# Patient Record
Sex: FEMALE | Employment: UNEMPLOYED | URBAN - METROPOLITAN AREA
[De-identification: names, ages, dates, MRNs, and addresses within clinical notes are randomized per-mention and may not be internally consistent; named-entity substitution may affect disease eponyms.]

---

## 2023-04-20 ENCOUNTER — OFFICE VISIT (OUTPATIENT)
Dept: CARDIOLOGY | Facility: CLINIC | Age: 43
End: 2023-04-20
Payer: COMMERCIAL

## 2023-04-20 VITALS
HEIGHT: 67 IN | BODY MASS INDEX: 19.74 KG/M2 | RESPIRATION RATE: 16 BRPM | HEART RATE: 68 BPM | DIASTOLIC BLOOD PRESSURE: 64 MMHG | WEIGHT: 125.8 LBS | SYSTOLIC BLOOD PRESSURE: 116 MMHG

## 2023-04-20 DIAGNOSIS — R00.2 PALPITATIONS: Primary | ICD-10-CM

## 2023-04-20 DIAGNOSIS — I49.3 PVC'S (PREMATURE VENTRICULAR CONTRACTIONS): ICD-10-CM

## 2023-04-20 PROCEDURE — 93000 ELECTROCARDIOGRAM COMPLETE: CPT | Performed by: INTERNAL MEDICINE

## 2023-04-20 PROCEDURE — 99203 OFFICE O/P NEW LOW 30 MIN: CPT | Performed by: INTERNAL MEDICINE

## 2023-04-20 RX ORDER — VITAMIN B COMPLEX
1 TABLET ORAL DAILY
COMMUNITY

## 2023-04-20 RX ORDER — MESALAMINE 1.2 G/1
4 TABLET, DELAYED RELEASE ORAL AT BEDTIME
COMMUNITY
Start: 2021-05-14

## 2023-04-20 RX ORDER — SPIRONOLACTONE 50 MG/1
4 TABLET, FILM COATED ORAL DAILY
COMMUNITY
Start: 2021-12-23

## 2023-04-20 RX ORDER — ESCITALOPRAM OXALATE 20 MG/1
20 TABLET ORAL DAILY
COMMUNITY
Start: 2022-09-26

## 2023-04-20 RX ORDER — TRIAMCINOLONE ACETONIDE 1 MG/G
OINTMENT TOPICAL
COMMUNITY
Start: 2023-01-11

## 2023-04-20 NOTE — LETTER
2023    [facility] [address]  [place] [address]    RE: Mary Da Silva       Dear Colleague,     I had the pleasure of seeing Mary Da Silva in the [facility].     [facility]  Cardiac Electrophysiology  [address]  [place] [address]   Office: [phone]  Fax: [phone]     Cardiac Electrophysiology Consultation    Patient: Mary Da Silva   : 1980     Referring Provider: No ref. provider found  Primary Care Provider: Zakiya Willis MD    CHIEF COMPLAINT/REASON FOR CONSULTATION  Palpitations    Assessment/Recommendations   Mary Da Silva is a 43 year old female with palpitations, ulcerative colitis, anxiety referred for consultation regarding palpitations.    Palpitations - previously has been reported to be related to PVCs.  Etiology and arrhythmia burden presently unknown.   We reviewed PVCs and management options including expectant management, indications for therapy (symptoms, reduced ventricular function), therapy options including medical therapy and PVC mapping and ablation.    We will characterize her arrhythmia burden and substrate further:  - 2 week Zio monitoring  - TTE  - we will contact her when results of the above are available and review options    Follow up: as above         History of Present Illness   Mary Da Silva is a 43 year old female with palpitations, ulcerative colitis, anxiety referred for consultation regarding palpitations.    Mrs. Da Silva has a long history of intermittent isolated palpitations for several years and had previously undergone evaluation at other facilities [place] of state - records from these are not available for review.  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  She underwent TTEs, stress test, ambulatory rhythm monitoring (she does not recall reported PVC frequency).  She had previously been noted to have possible pulmonary  "hypertension and underwent RHC which was normal.  Her palpitations occur mostly with rest and are less frequent with activity (she notes PVCs reported at rest at prior stress tests, quiescent with activity).        She is active with cardio and lifting - she notes no exertional limitations.  She denies chest pain or syncope.  She is a PA-C.       Physical Examination  Review of Systems   VITALS: /64 (BP Location: Left arm, Patient Position: Sitting, Cuff Size: Adult Regular)   Pulse 68   Resp 16   Ht 1.702 m (5' 7\")   Wt 57.1 kg (125 lb 12.8 oz)   BMI 19.70 kg/m    Wt Readings from Last 3 Encounters:   No data found for Wt     CONSTITUTIONAL: well nourished, comfortable, no distress  EYES:  Conjunctivae pink, sclerae clear.    E/N/T:  Oral mucosa pink  RESPIRATORY:  Respiratory effort is normal  CARDIOVASCULAR:  normal S1 and S2  GASTROINTESTINAL:  Abdomen without masses or tenderness  EXTREMITIES:  No clubbing or cyanosis.    MUSCULOSKELETAL:  Overall grossly normal muscle strength  SKIN:  Overall, skin warm and dry, no lesions.  NEURO/PSYCH:  Oriented x 3 with normal affect.   Constitutional:  No weight loss or loss of appetite    Eyes:  No difficulty with vision, no double vision, no dry eyes  ENT:  No sore throat, difficulty swallowing; changes in hearing or tinnitus  Cardiovascular: As detailed above  Respiratory:  No cough  Musculoskeletal  No joint pain, muscle aches  Neurologic:  No syncope, lightheadedness, fainting spells   Hematologic: No easy bruising, excessive bleeding tendency   Gastrointestinal:  No jaundice, abdominal pain or abdominal bloating  Genitourinary: No changes in urinary habits, no trouble urinating    Psychiatric: No anxiety or depression      Medical History  Surgical History   No past medical history on file. No past surgical history on file.      Family History Social History   No family history on file.            Medications  Allergies   No current outpatient medications " on file.   Not on File       Lab Results    Chemistry CBC Cardiac Enzymes/BNP/TSH/INR   No results for input(s): NA, POTASSIUM, CHLORIDE, CO2, GLC, BUN, CR, GFRESTIMATED, STEPHANIE in the last 48223 hours.    Invalid input(s): GRFESTBLACK  No results for input(s): CR in the last 71007 hours.       No results for input(s): WBC, HGB, HCT, MCV, PLT in the last 01705 hours.  No results for input(s): HGB in the last 79744 hours. No results for input(s): TROPONINI in the last 83749 hours.  No results for input(s): BNP, NTBNPI, NTBNP in the last 62851 hours.  No results for input(s): TSH in the last 79970 hours.  No results for input(s): INR in the last 57441 hours.      Data Review    ECGs (tracings independently reviewed)  4/20/2023 (run for extended duration - no ectopy noted) - SR 58bpm, nonspecific repolarization abnormality, QT/QTc 440/431ms         Cc: Moises Angulo MD  4/20/2023  10:43 AM        Thank you for allowing me to participate in the care of your patient.      Sincerely,     Sofiya Angulo MD     [facility]  cc: No referring provider defined for this encounter.

## 2023-04-20 NOTE — PATIENT INSTRUCTIONS
[facility]  Cardiac Electrophysiology  [address]  [place] [address]   Office: [phone]  Fax: [phone]       Thank you for seeing us in clinic today - it is a pleasure to be a part of your care team.  Below is a summary of our plan from today's visit.      You have previously been noted to have premature ventricular contractions (PVCs).  We reviewed PVCs and management options including observation, indications for therapy (symptoms, reduced ventricular function), therapy options including medical therapy and PVC mapping and ablation.    We will characterize your arrhythmia burden and substrate further:  - 2 week Zio monitoring  - echocardiogram    Please do not hesitate to be in touch with our office at [phone] with any questions that may arise.      Thank you for trusting us with your care,    Sofiya Angulo MD  Clinical Cardiac Electrophysiology  [facility]  [address]  [place] [address]   Office: [phone]  Fax: [phone]

## 2023-04-20 NOTE — PROGRESS NOTES
[facility]  Cardiac Electrophysiology  [address]  [place] [address]   Office: [phone]  Fax: [phone]     Cardiac Electrophysiology Consultation    Patient: Mary Da Silva   : 1980     Referring Provider: No ref. provider found  Primary Care Provider: Moises Willis MD    CHIEF COMPLAINT/REASON FOR CONSULTATION  Palpitations    Assessment/Recommendations   Mary Da Silva is a 43 year old female with palpitations, ulcerative colitis, anxiety referred for consultation regarding palpitations.    Palpitations - previously has been reported to be related to PVCs.  Etiology and arrhythmia burden presently unknown.   We reviewed PVCs and management options including expectant management, indications for therapy (symptoms, reduced ventricular function), therapy options including medical therapy and PVC mapping and ablation.    We will characterize her arrhythmia burden and substrate further:  - 2 week Zio monitoring  - TTE  - we will contact her when results of the above are available and review options    Follow up: as above         History of Present Illness   Mary Da Silva is a 43 year old female with palpitations, ulcerative colitis, anxiety referred for consultation regarding palpitations.    Mrs. Da [place] has a long history of intermittent isolated palpitations for several years and had previously undergone evaluation at other facilities out of state - records from these are not available for review.  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  She underwent TTEs, stress test, ambulatory rhythm monitoring (she does not recall reported PVC frequency).  She had previously been noted to have possible pulmonary hypertension and underwent RHC which was normal.  Her palpitations occur mostly with rest and are less frequent with activity (she notes PVCs reported at rest at prior stress tests, quiescent with activity).        She  "is active with cardio and lifting - she notes no exertional limitations.  She denies chest pain or syncope.  She is a PA-C.       Physical Examination  Review of Systems   VITALS: /64 (BP Location: Left arm, Patient Position: Sitting, Cuff Size: Adult Regular)   Pulse 68   Resp 16   Ht 1.702 m (5' 7\")   Wt 57.1 kg (125 lb 12.8 oz)   BMI 19.70 kg/m    Wt Readings from Last 3 Encounters:   No data found for Wt     CONSTITUTIONAL: well nourished, comfortable, no distress  EYES:  Conjunctivae pink, sclerae clear.    E/N/T:  Oral mucosa pink  RESPIRATORY:  Respiratory effort is normal  CARDIOVASCULAR:  normal S1 and S2  GASTROINTESTINAL:  Abdomen without masses or tenderness  EXTREMITIES:  No clubbing or cyanosis.    MUSCULOSKELETAL:  Overall grossly normal muscle strength  SKIN:  Overall, skin warm and dry, no lesions.  NEURO/PSYCH:  Oriented x 3 with normal affect.   Constitutional:  No weight loss or loss of appetite    Eyes:  No difficulty with vision, no double vision, no dry eyes  ENT:  No sore throat, difficulty swallowing; changes in hearing or tinnitus  Cardiovascular: As detailed above  Respiratory:  No cough  Musculoskeletal  No joint pain, muscle aches  Neurologic:  No syncope, lightheadedness, fainting spells   Hematologic: No easy bruising, excessive bleeding tendency   Gastrointestinal:  No jaundice, abdominal pain or abdominal bloating  Genitourinary: No changes in urinary habits, no trouble urinating    Psychiatric: No anxiety or depression      Medical History  Surgical History   No past medical history on file. No past surgical history on file.      Family History Social History   No family history on file.            Medications  Allergies   No current outpatient medications on file.   Not on File       Lab Results    Chemistry CBC Cardiac Enzymes/BNP/TSH/INR   No results for input(s): NA, POTASSIUM, CHLORIDE, CO2, GLC, BUN, CR, GFRESTIMATED, STEPHANIE in the last 91494 hours.    Invalid " input(s): GRFESTBLACK  No results for input(s): CR in the last 24824 hours.       No results for input(s): WBC, HGB, HCT, MCV, PLT in the last 86939 hours.  No results for input(s): HGB in the last 25846 hours. No results for input(s): TROPONINI in the last 02192 hours.  No results for input(s): BNP, NTBNPI, NTBNP in the last 31336 hours.  No results for input(s): TSH in the last 40833 hours.  No results for input(s): INR in the last 42283 hours.      Data Review    ECGs (tracings independently reviewed)  4/20/2023 (run for extended duration - no ectopy noted) - SR 58bpm, nonspecific repolarization abnormality, QT/QTc 440/431ms         Cc: Moises Angulo MD  4/20/2023  10:43 AM

## 2023-04-21 LAB
ATRIAL RATE - MUSE: 58 BPM
DIASTOLIC BLOOD PRESSURE - MUSE: NORMAL MMHG
INTERPRETATION ECG - MUSE: NORMAL
P AXIS - MUSE: 46 DEGREES
PR INTERVAL - MUSE: 130 MS
QRS DURATION - MUSE: 86 MS
QT - MUSE: 440 MS
QTC - MUSE: 431 MS
R AXIS - MUSE: 76 DEGREES
SYSTOLIC BLOOD PRESSURE - MUSE: NORMAL MMHG
T AXIS - MUSE: 31 DEGREES
VENTRICULAR RATE- MUSE: 58 BPM

## 2023-06-08 ENCOUNTER — HOSPITAL ENCOUNTER (OUTPATIENT)
Dept: CARDIOLOGY | Facility: CLINIC | Age: 43
Discharge: HOME | End: 2023-06-08
Attending: INTERNAL MEDICINE | Admitting: INTERNAL MEDICINE
Payer: COMMERCIAL

## 2023-06-08 DIAGNOSIS — I49.3 PVC'S (PREMATURE VENTRICULAR CONTRACTIONS): ICD-10-CM

## 2023-06-08 DIAGNOSIS — R00.2 PALPITATIONS: ICD-10-CM

## 2023-06-08 PROCEDURE — 93248 EXT ECG>7D<15D REV&INTERPJ: CPT | Performed by: INTERNAL MEDICINE

## 2023-06-08 PROCEDURE — 93246 EXT ECG>7D<15D RECORDING: CPT

## 2023-08-14 ENCOUNTER — HOSPITAL ENCOUNTER (OUTPATIENT)
Dept: CARDIOLOGY | Facility: CLINIC | Age: 43
Discharge: HOME | End: 2023-08-14
Attending: INTERNAL MEDICINE | Admitting: INTERNAL MEDICINE
Payer: COMMERCIAL

## 2023-08-14 DIAGNOSIS — R00.2 PALPITATIONS: ICD-10-CM

## 2023-08-14 LAB — LVEF ECHO: NORMAL

## 2023-08-14 PROCEDURE — 93306 TTE W/DOPPLER COMPLETE: CPT | Mod: 26 | Performed by: INTERNAL MEDICINE

## 2023-08-14 PROCEDURE — 93306 TTE W/DOPPLER COMPLETE: CPT
